# Patient Record
Sex: MALE | Race: WHITE | NOT HISPANIC OR LATINO | URBAN - METROPOLITAN AREA
[De-identification: names, ages, dates, MRNs, and addresses within clinical notes are randomized per-mention and may not be internally consistent; named-entity substitution may affect disease eponyms.]

---

## 2017-08-29 ENCOUNTER — NEW REFERRAL (OUTPATIENT)
Dept: URBAN - METROPOLITAN AREA CLINIC 26 | Facility: CLINIC | Age: 57
End: 2017-08-29

## 2017-08-29 VITALS — HEIGHT: 69 IN | BODY MASS INDEX: 25.92 KG/M2 | WEIGHT: 175 LBS

## 2017-08-29 DIAGNOSIS — H43.812: ICD-10-CM

## 2017-08-29 DIAGNOSIS — H33.301: ICD-10-CM

## 2017-08-29 PROCEDURE — 92225 OPHTHALMOSCOPY (INITIAL): CPT

## 2017-08-29 PROCEDURE — 92004 COMPRE OPH EXAM NEW PT 1/>: CPT

## 2017-08-29 ASSESSMENT — VISUAL ACUITY
OS_SC: 20/20
OD_SC: 20/20

## 2017-08-29 ASSESSMENT — TONOMETRY
OD_IOP_MMHG: 13
OS_IOP_MMHG: 13

## 2017-09-25 ENCOUNTER — FOLLOW UP (OUTPATIENT)
Dept: URBAN - METROPOLITAN AREA CLINIC 33 | Facility: CLINIC | Age: 57
End: 2017-09-25

## 2017-09-25 VITALS — DIASTOLIC BLOOD PRESSURE: 82 MMHG | SYSTOLIC BLOOD PRESSURE: 103 MMHG | HEART RATE: 79 BPM | HEIGHT: 60 IN

## 2017-09-25 DIAGNOSIS — H02.836: ICD-10-CM

## 2017-09-25 DIAGNOSIS — H02.833: ICD-10-CM

## 2017-09-25 DIAGNOSIS — H04.123: ICD-10-CM

## 2017-09-25 DIAGNOSIS — H33.301: ICD-10-CM

## 2017-09-25 DIAGNOSIS — H43.812: ICD-10-CM

## 2017-09-25 PROCEDURE — 92012 INTRM OPH EXAM EST PATIENT: CPT

## 2017-09-25 ASSESSMENT — TONOMETRY
OD_IOP_MMHG: 15
OS_IOP_MMHG: 15

## 2017-09-25 ASSESSMENT — VISUAL ACUITY
OD_CC: 20/20
OS_CC: 20/20

## 2021-04-29 NOTE — PATIENT DISCUSSION
Soft MF CL rx given- no change. Pt has 2 rx's one for tennis/sports for better distance vision/LOW add.

## 2022-04-29 NOTE — PATIENT DISCUSSION
JAYCE, spoke with patient and she said she was exposed to her granddtr who was at a party, she has no COVID symptoms.  Informed her that COVID 19 test showed \"not detected.\"   Stable.